# Patient Record
Sex: MALE | Race: WHITE | NOT HISPANIC OR LATINO | Employment: OTHER | URBAN - NONMETROPOLITAN AREA
[De-identification: names, ages, dates, MRNs, and addresses within clinical notes are randomized per-mention and may not be internally consistent; named-entity substitution may affect disease eponyms.]

---

## 2023-09-26 ENCOUNTER — TRANSCRIBE ORDERS (OUTPATIENT)
Dept: CARDIOLOGY | Facility: CLINIC | Age: 88
End: 2023-09-26
Payer: MEDICARE

## 2023-09-26 DIAGNOSIS — Z95.2 HISTORY OF MITRAL VALVE REPLACEMENT: ICD-10-CM

## 2023-09-26 DIAGNOSIS — I48.20 CHRONIC ATRIAL FIBRILLATION (MULTI): ICD-10-CM

## 2023-09-26 DIAGNOSIS — N30.91 HEMATURIA DUE TO CYSTITIS: ICD-10-CM

## 2023-09-26 DIAGNOSIS — Z95.5 STENTED CORONARY ARTERY: ICD-10-CM

## 2023-11-01 ENCOUNTER — TELEMEDICINE (OUTPATIENT)
Dept: CARDIOLOGY | Facility: CLINIC | Age: 88
End: 2023-11-01
Payer: MEDICARE

## 2023-11-01 DIAGNOSIS — I48.0 PAROXYSMAL ATRIAL FIBRILLATION (MULTI): Primary | ICD-10-CM

## 2023-11-01 PROCEDURE — 99204 OFFICE O/P NEW MOD 45 MIN: CPT | Performed by: INTERNAL MEDICINE

## 2023-11-01 PROCEDURE — 99214 OFFICE O/P EST MOD 30 MIN: CPT | Mod: 95 | Performed by: INTERNAL MEDICINE

## 2023-11-01 NOTE — PROGRESS NOTES
Virtual visit      Cardio: Dr. Fransisco TANG was asked by Dr. Moody to evaluate this patient in consultation for evaluation of left atrial appendage closure.    The patient is a 88-year-old gentleman with past medical history of CABG with subsequent redo CABG in 2019 with PFO closure, mitral valve repair, tricuspid valve repair and left atrial appendage ligation. The patient has normal LVEF.    The patient has been maintained on anticoagulation post left atrial appendage ligation in 2019.  Patient continues to take Xarelto.  Patient experienced episodes of hematuria.  His primary cardiology team does not think that he is a good candidate for long-term anticoagulation.  He is referred to our clinic for further recommendation.    Given the patient's significant urological bleeding,  the patient is referred for consideration of left atrial appendage closure for stroke risk reduction in case he has residual problems left from left atrial appendage ligation.      ROS:  Constitutional: not feeling tired, not feeling poorly, no fever and no chills.   Eyes: no eyesight problems, no blurred vision, no diplopia, no eye pain, no purulent discharge from the eyes, eyes not red, no dryness of the eyes and no itching of the eyes.   ENT: no nosebleeds, no hearing loss, no tinnitus, no earache, no sore throat, no hoarseness, no swollen glands in the neck and no nasal discharge.   Cardiovascular: no intermittent leg claudication, no chest pain, no tightness or heavy pressure, no shortness of breath, no palpitations, no lower extremity edema, the heart rate was not slow, the heart rate was not fast and as noted in HPI.   Respiratory: no chronic cough, not coughing up sputum,  no wheezing that is consistent with asthma, no asthma, no orthopnea and no postural nocturnal dyspnea.   Gastrointestinal: no change in bowel habits, no blood in stools, no diarrhea, no constipation, no nausea, no vomiting, no abdominal pain, no signs and  symptoms of ulcer disease, no norman colored stools and no intolerance to fatty foods.   Genitourinary: no hematuria,  no urinary frequency, no dysuria, no incontinence, no burning sensation during urination, no urinary hesitancy, no nocturia, no genital lesion, no testicular pain, urinary stream is not smaller and urinary stream does not start and stop.   Musculoskeletal: no arthralgias, no myalgias, no joint swelling, no joint stiffness, no muscle weakness, no back pain, no limb pain, no limb swelling and no difficulty walking.   Skin: no skin rashes, no change in skin color and pigmentation, no skin lesions and no skin lumps.   Neurological: no seizures, no frequent falls, no headaches, no dizziness, no tingling, no fainting and no limb weakness.   Psychiatric: no depression, not suicidal, no confusion, no memory lapses or loss, no anxiety, no personality change and no emotional problems.   Endocrine: no goiter, no thyroid disorder, no diabetes mellitus, no excessive thirst, no dry skin, no cold intolerance, no heat intolerance, no erectile dysfunction, no increased urinary frequency, no proptosis and no deepening of the voice.   Hematologic/Lymphatic: no bleeding issues.   All other systems have been reviewed and are negative for complaint.     Physical Exam:     There were no vitals taken for this visit.       Labs:    Results for orders placed or performed in visit on 03/28/19   Comprehensive Metabolic Panel   Result Value Ref Range    Glucose 102 (H) 74 - 99 mg/dL    Sodium 140 136 - 145 mmol/L    Potassium 3.8 3.5 - 5.3 mmol/L    Chloride 104 98 - 107 mmol/L    Bicarbonate 25 21 - 32 mmol/L    Anion Gap 15 10 - 20 mmol/L    Urea Nitrogen 22 6 - 23 mg/dL    Creatinine 0.88 0.50 - 1.30 mg/dL    GLOMERULAR FILTRATION RATE-NON AFRICAN AMERICAN >60 >60 mL/min/1.73m2    GLOMERULAR FILTRATION RATE- >60 >60 mL/min/1.73m2    Calcium 9.4 8.6 - 10.6 mg/dL    Albumin 4.0 3.4 - 5.0 g/dL    Alkaline  Phosphatase 59 33 - 136 U/L    Total Protein 5.8 (L) 6.4 - 8.2 g/dL    AST 16 9 - 39 U/L    Total Bilirubin 0.7 0.0 - 1.2 mg/dL    ALT (SGPT) 16 10 - 52 U/L   Hemoglobin A1C   Result Value Ref Range    Hemoglobin A1C 5.4 %    Estimated Average Glucose 108 MG/DL   CBC and Auto Differential   Result Value Ref Range    WBC 4.9 4.4 - 11.3 x10E9/L    nRBC 0.0 0.0 - 0.0 /100 WBC    RBC 3.47 (L) 4.50 - 5.90 x10E12/L    Hemoglobin 11.7 (L) 13.5 - 17.5 g/dL    Hematocrit 35.4 (L) 41.0 - 52.0 %     (H) 80 - 100 fL    MCHC 33.1 32.0 - 36.0 g/dL    Platelets 158 150 - 450 x10E9/L    RDW 13.6 11.5 - 14.5 %    Neutrophils % 56.0 40.0 - 80.0 %    Immature Granulocytes %, Automated 0.2 0.0 - 0.9 %    Lymphocytes % 28.5 13.0 - 44.0 %    Monocytes % 11.8 2.0 - 10.0 %    Eosinophils % 2.9 0.0 - 6.0 %    Basophils % 0.6 0.0 - 2.0 %    Neutrophils Absolute 2.75 1.60 - 5.50 x10E9/L    Lymphocytes Absolute 1.40 0.80 - 3.00 x10E9/L    Monocytes Absolute 0.58 0.05 - 0.80 x10E9/L    Eosinophils Absolute 0.14 0.00 - 0.40 x10E9/L    Basophils Absolute 0.03 0.00 - 0.10 x10E9/L   Coagulation Screen   Result Value Ref Range    Protime 11.5 9.7 - 12.7 sec    INR 1.0 0.9 - 1.1    aPTT 34 28 - 38 sec   Abo/Rh Group Test   Result Value Ref Range    ABO GROUP (TYPE) IN BLOOD A     Rh POS    Type And Screen Data Conversion   Result Value Ref Range    ABO GROUP (TYPE) IN BLOOD A     Rh POS     ANTIBODY SCREEN NEG           Medications:    No current outpatient medications       Assessment:    The patient is a 88-year-old gentleman with past medical history of CABG with subsequent redo CABG in 2019 with PFO closure, mitral valve repair, tricuspid valve repair and left atrial appendage ligation.  He has had episodes of urological bleeding while being on Xarelto.  He is deemed to be a poor candidate for long-term anticoagulation.    The CHADS-VASC score is 5 and HAS-BLED score is 3. The patient is at increased risk of both bleeding and stroke.  As  such the patient would seem a reasonable candidate for consideration of left atrial appendage occluder placement. However, he had left atrial appendage ligation performed as part of his open heart surgery in 2019.  There is still a possibility of a residual pouch after such a surgery which can predispose patient to embolic strokes, which could be potentially amenable to percutaneous left atrial appendage closure.  In order to determine that we will plan a CT scan on the patient.    Once the CT scan is performed.  We will bring the patient back for follow-up and discuss the findings before final decision is made.    Thank you, Dr. Moody, for this consultation and for allowing me to participate in the care of this patient.

## 2023-11-02 PROBLEM — Z95.4 S/P TRICUSPID VALVE REPLACEMENT: Status: ACTIVE | Noted: 2023-11-02

## 2023-11-02 PROBLEM — I10 ESSENTIAL HYPERTENSION: Status: ACTIVE | Noted: 2023-11-02

## 2023-11-02 PROBLEM — K22.2 SCHATZKI'S RING: Status: ACTIVE | Noted: 2023-11-02

## 2023-11-02 PROBLEM — E78.5 HYPERLIPIDEMIA: Status: ACTIVE | Noted: 2023-11-02

## 2023-11-02 PROBLEM — R31.21 ASYMPTOMATIC MICROSCOPIC HEMATURIA: Status: ACTIVE | Noted: 2023-11-02

## 2023-11-02 PROBLEM — K44.9 HIATAL HERNIA: Status: ACTIVE | Noted: 2023-11-02

## 2023-11-02 PROBLEM — I25.10 CORONARY ARTERY DISEASE: Status: ACTIVE | Noted: 2023-11-02

## 2023-11-02 PROBLEM — I48.20 CHRONIC ATRIAL FIBRILLATION (MULTI): Status: ACTIVE | Noted: 2023-11-02

## 2023-11-02 PROBLEM — I34.0 MITRAL REGURGITATION: Status: ACTIVE | Noted: 2023-11-02

## 2023-11-02 PROBLEM — Z95.5 S/P CORONARY ARTERY STENT PLACEMENT: Status: ACTIVE | Noted: 2023-11-02

## 2023-11-02 PROBLEM — Z85.46 HISTORY OF PROSTATE CANCER: Status: ACTIVE | Noted: 2023-11-02

## 2023-11-02 RX ORDER — GLUCOSAMINE/CHONDR SU A SOD 167-133 MG
500 CAPSULE ORAL DAILY
COMMUNITY
End: 2023-11-06 | Stop reason: ALTCHOICE

## 2023-11-02 RX ORDER — PERPHENAZINE/AMITRIPTYLINE HCL 2 MG-25 MG
1 TABLET ORAL DAILY
COMMUNITY
End: 2023-11-06 | Stop reason: ALTCHOICE

## 2023-11-02 RX ORDER — ACETAMINOPHEN 500 MG
5000 TABLET ORAL DAILY
COMMUNITY
End: 2023-11-06 | Stop reason: ALTCHOICE

## 2023-11-06 ENCOUNTER — TELEPHONE (OUTPATIENT)
Dept: CARDIOLOGY | Facility: HOSPITAL | Age: 88
End: 2023-11-06

## 2023-11-06 ENCOUNTER — OFFICE VISIT (OUTPATIENT)
Dept: CARDIOLOGY | Facility: CLINIC | Age: 88
End: 2023-11-06
Payer: MEDICARE

## 2023-11-06 VITALS
WEIGHT: 164 LBS | SYSTOLIC BLOOD PRESSURE: 146 MMHG | BODY MASS INDEX: 24.29 KG/M2 | HEIGHT: 69 IN | DIASTOLIC BLOOD PRESSURE: 82 MMHG | HEART RATE: 68 BPM

## 2023-11-06 DIAGNOSIS — Z79.01 LONG TERM CURRENT USE OF ANTICOAGULANT THERAPY: ICD-10-CM

## 2023-11-06 DIAGNOSIS — I48.91 ATRIAL FIBRILLATION, UNSPECIFIED TYPE (MULTI): ICD-10-CM

## 2023-11-06 DIAGNOSIS — Z95.4 S/P TRICUSPID VALVE REPLACEMENT: ICD-10-CM

## 2023-11-06 DIAGNOSIS — Z98.890 S/P MITRAL VALVE REPAIR: ICD-10-CM

## 2023-11-06 DIAGNOSIS — I48.0 PAROXYSMAL ATRIAL FIBRILLATION (MULTI): ICD-10-CM

## 2023-11-06 DIAGNOSIS — Z78.9 STATIN INTOLERANCE: ICD-10-CM

## 2023-11-06 DIAGNOSIS — I25.10 CORONARY ARTERY DISEASE INVOLVING NATIVE CORONARY ARTERY OF NATIVE HEART WITHOUT ANGINA PECTORIS: Primary | ICD-10-CM

## 2023-11-06 PROBLEM — C61 CARCINOMA OF PROSTATE (MULTI): Status: ACTIVE | Noted: 2023-11-06

## 2023-11-06 PROBLEM — I48.20 CHRONIC ATRIAL FIBRILLATION (MULTI): Status: RESOLVED | Noted: 2023-11-02 | Resolved: 2023-11-06

## 2023-11-06 PROCEDURE — 99213 OFFICE O/P EST LOW 20 MIN: CPT | Performed by: NURSE PRACTITIONER

## 2023-11-06 PROCEDURE — 1036F TOBACCO NON-USER: CPT | Performed by: NURSE PRACTITIONER

## 2023-11-06 PROCEDURE — 1159F MED LIST DOCD IN RCRD: CPT | Performed by: NURSE PRACTITIONER

## 2023-11-06 PROCEDURE — 3077F SYST BP >= 140 MM HG: CPT | Performed by: NURSE PRACTITIONER

## 2023-11-06 PROCEDURE — 3079F DIAST BP 80-89 MM HG: CPT | Performed by: NURSE PRACTITIONER

## 2023-11-06 PROCEDURE — 1160F RVW MEDS BY RX/DR IN RCRD: CPT | Performed by: NURSE PRACTITIONER

## 2023-11-06 ASSESSMENT — ENCOUNTER SYMPTOMS
SYNCOPE: 0
DYSPNEA ON EXERTION: 0
IRREGULAR HEARTBEAT: 0
ORTHOPNEA: 0
NEAR-SYNCOPE: 0
PALPITATIONS: 0
PND: 0

## 2023-11-06 NOTE — ASSESSMENT & PLAN NOTE
April 2019  TVR #28 annuloplasty ring  MVR #29 Medtronic Simulus band  Ligation left atrial appendage  PFO closure    Redo CABG with reverse SVG to posterior lateral circumflex    Valvular function reassess September 2023 echo with normally functioning repairs.  LVEF 55 to 60%

## 2023-11-06 NOTE — PATIENT INSTRUCTIONS
Please bring all medicines, vitamins, and herbal supplements with you when you come to the office.    Prescriptions will not be filled unless you are compliant with your follow up appointments or have a follow up appointment scheduled as per instruction of your physician. Refills should be requested at the time of your visit.    PLAN:   Through informed decision making process incorporating patients unique circumstances, the following treatment plan will be initiated:    1.  Prescription drug management of cardiovascular medication for efficacy, adherence to treatment, side effect assessment and polypharmacy. Current treatment clinically warranted and to continue without modifications.    2. Return for follow-up; in the interim, contact the office if new symptoms arise.  Dr. Moody as scheduled

## 2023-11-06 NOTE — ASSESSMENT & PLAN NOTE
Current daily activity 4 METS without recurrent symptoms    Secondary prevention:  Statin intolerant  No diabetes  Untreated hyperlipidemia

## 2023-11-06 NOTE — ASSESSMENT & PLAN NOTE
CHADS VASc 3   Summer 2023 hematuria on Coumadin  Rechallenged with Xarelto 20 mg with recurrent hematuria: Dose reduced to 10 mg daily.  Has been seen and evaluated by Dr. Pineda with plans for CT to reassess left atrial appendage  Currently denies any bleeding diatheses on Xarelto 10 mg

## 2023-11-06 NOTE — PROGRESS NOTES
"Chief Complaint  \"Doing good\"    Reason for Visit  2-month follow-up to reassess tolerance to anticoagulation.  Patient presents to the office today for outpatient follow-up for anticoagulation management.  Last evaluated in clinic by Dr. Moody September 2023.  At that time he was placed on Xarelto 20 mg daily.  He called into the office with mild hematuria and dose was reduced to 10 mg daily.  He has been tolerating that dose without difficulties.  He was recently evaluated by Dr. Pineda, is in process of scheduling CT.    Presents today ambulatory with steady gait.  Accompanied by patient  Patient denies any hospitalizations or significant changes to interval medical history since last office follow-up.     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  He ambulated in from the parking lot without concerns.  He reports he can\" never tell\" when he is out of rhythm, auscultatory regular rate and rhythm on exam.  He continues to tolerate low-dose Xarelto without any bleeding diatheses.  He is established with Dr. Pineda with testing arranged.    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  Denies any change in exercise capacity or functional tolerance since last office visit.    The importance of secondary prevention reviewed:  HTN: Denies treatment  HLD: Statin intolerant  DM: Denies  Smoker: Denies  BMI:  Reviewed the merits of healthy lifestyle choices on overall cardiovascular health.  He is off aspirin due to Xarelto.    Review of Systems   Cardiovascular:  Negative for chest pain, dyspnea on exertion, irregular heartbeat, leg " "swelling, near-syncope, orthopnea, palpitations, paroxysmal nocturnal dyspnea and syncope.        Visit Vitals  /82 (BP Location: Left arm, Patient Position: Sitting)   Pulse 68   Ht 1.753 m (5' 9\")   Wt 74.4 kg (164 lb)   BMI 24.22 kg/m²   Smoking Status Former   BSA 1.9 m²     Physical Exam  Vitals and nursing note reviewed.   Constitutional:       Appearance: Normal appearance.   Cardiovascular:      Rate and Rhythm: Normal rate and regular rhythm.      Heart sounds: Normal heart sounds.   Pulmonary:      Effort: Pulmonary effort is normal.      Breath sounds: Normal breath sounds.   Musculoskeletal:      Cervical back: Full passive range of motion without pain.      Right lower leg: No edema.      Left lower leg: No edema.   Skin:     General: Skin is cool.   Neurological:      Mental Status: He is alert and oriented to person, place, and time.   Psychiatric:         Attention and Perception: Attention normal.         Mood and Affect: Mood normal.         Behavior: Behavior is cooperative.        Allergies   Allergen Reactions    Statins-Hmg-Coa Reductase Inhibitors Myalgia    Ace Inhibitors Unknown    Metoprolol Unknown         Current Outpatient Medications   Medication Instructions    rivaroxaban (XARELTO) 10 mg, oral, Daily      Assessment:    Coronary artery disease  Current daily activity 4 METS without recurrent symptoms    Secondary prevention:  Statin intolerant  No diabetes  Untreated hyperlipidemia    Essential hypertension  Currently no treatment    Paroxysmal atrial fibrillation (CMS/HCC)  Auscultatory regular rate and rhythm in the office today    Cardiac and Vasculature  April 2019  TVR #28 annuloplasty ring  MVR #29 Medtronic Simulus band  Ligation left atrial appendage  PFO closure    Redo CABG with reverse SVG to posterior lateral circumflex    Valvular function reassess September 2023 echo with normally functioning repairs.  LVEF 55 to 60%    Long term current use of anticoagulant " therapy  CHADS VASc 3   Summer 2023 hematuria on Coumadin  Rechallenged with Xarelto 20 mg with recurrent hematuria: Dose reduced to 10 mg daily.  Has been seen and evaluated by Dr. Pineda with plans for CT to reassess left atrial appendage  Currently denies any bleeding diatheses on Xarelto 10 mg       Plan:     Through informed decision making process incorporating patients unique circumstances, the following treatment plan will be initiated:    1.  Prescription drug management of cardiovascular medication for efficacy, adherence to treatment, side effect assessment and polypharmacy. Current treatment clinically warranted and to continue without modifications.    2. Return for follow-up; in the interim, contact the office if new symptoms arise.  Dr. Moody as scheduled    3.  Keep scheduled testing and follow-up as arranged with Dr. Edwin Isidro  MSN, APRN-CNP, PMHNP-BC  [facility]    Please excuse any errors in grammar or translation related to this dictation. Voice recognition software was utilized to prepare this document.

## 2023-11-14 ENCOUNTER — ANCILLARY PROCEDURE (OUTPATIENT)
Dept: RADIOLOGY | Facility: CLINIC | Age: 88
End: 2023-11-14
Payer: MEDICARE

## 2023-11-14 VITALS
DIASTOLIC BLOOD PRESSURE: 75 MMHG | HEART RATE: 62 BPM | SYSTOLIC BLOOD PRESSURE: 141 MMHG | OXYGEN SATURATION: 96 % | RESPIRATION RATE: 16 BRPM

## 2023-11-14 DIAGNOSIS — I48.91 ATRIAL FIBRILLATION, UNSPECIFIED TYPE (MULTI): ICD-10-CM

## 2023-11-14 PROCEDURE — 75572 CT HRT W/3D IMAGE: CPT | Performed by: INTERNAL MEDICINE

## 2023-11-14 PROCEDURE — 75572 CT HRT W/3D IMAGE: CPT

## 2023-11-14 PROCEDURE — 2500000004 HC RX 250 GENERAL PHARMACY W/ HCPCS (ALT 636 FOR OP/ED): Performed by: INTERNAL MEDICINE

## 2023-11-14 PROCEDURE — 2550000001 HC RX 255 CONTRASTS: Performed by: INTERNAL MEDICINE

## 2023-11-14 RX ADMIN — IOHEXOL 70 ML: 350 INJECTION, SOLUTION INTRAVENOUS at 11:56

## 2023-11-14 RX ADMIN — SODIUM CHLORIDE 500 ML: 0.9 INJECTION, SOLUTION INTRAVENOUS at 14:13

## 2023-11-17 ENCOUNTER — APPOINTMENT (OUTPATIENT)
Dept: RADIOLOGY | Facility: CLINIC | Age: 88
End: 2023-11-17
Payer: MEDICARE

## 2023-11-21 ENCOUNTER — APPOINTMENT (OUTPATIENT)
Dept: RADIOLOGY | Facility: CLINIC | Age: 88
End: 2023-11-21
Payer: MEDICARE

## 2023-11-29 ENCOUNTER — TELEPHONE (OUTPATIENT)
Dept: CARDIOLOGY | Facility: CLINIC | Age: 88
End: 2023-11-29

## 2023-11-29 ENCOUNTER — TELEMEDICINE (OUTPATIENT)
Dept: CARDIOLOGY | Facility: CLINIC | Age: 88
End: 2023-11-29
Payer: MEDICARE

## 2023-11-29 DIAGNOSIS — I48.11 LONGSTANDING PERSISTENT ATRIAL FIBRILLATION (MULTI): Primary | ICD-10-CM

## 2023-11-29 PROCEDURE — 99212 OFFICE O/P EST SF 10 MIN: CPT | Performed by: INTERNAL MEDICINE

## 2023-11-29 NOTE — TELEPHONE ENCOUNTER
Patient verbalized understanding on stopping xarelto.    Patient is now inquiring should he go back on ASA 81mg daily.

## 2023-11-29 NOTE — TELEPHONE ENCOUNTER
Patient can come off Xarelto altogether, his left atrial appendage is previously been ligated and reviewed per CT imaging by Dr. Pineda.

## 2023-11-30 NOTE — PROGRESS NOTES
Virtual Visit      Cardio: Dr. Moody      For chart review, the patient is a 88-year-old gentleman with past medical history of CABG with subsequent redo CABG in 2019 with PFO closure, mitral valve repair, tricuspid valve repair and left atrial appendage ligation. The patient has normal LVEF. The patient has been maintained on anticoagulation post left atrial appendage ligation in 2019.  Patient continues to take Xarelto.  Patient experienced episodes of hematuria.  His primary cardiology team does not think that he is a good candidate for long-term anticoagulation.  He was referred to our clinic for further recommendation.     We performed a CT scan on the patient which confirmed successful and good-quality left atrial appendage ligation.  The patient comes for follow-up consultation as he was previously seen in our clinic for possible percutaneous left atrial appendage closure.    Virtual visit. Audio only. 10 minutes.    ROS:  Constitutional: not feeling tired, not feeling poorly, no fever and no chills.   Eyes: no eyesight problems, no blurred vision, no diplopia, no eye pain, no purulent discharge from the eyes, eyes not red, no dryness of the eyes and no itching of the eyes.   ENT: no nosebleeds, no hearing loss, no tinnitus, no earache, no sore throat, no hoarseness, no swollen glands in the neck and no nasal discharge.   Cardiovascular: no intermittent leg claudication, no chest pain, no tightness or heavy pressure, no shortness of breath, no palpitations, no lower extremity edema, the heart rate was not slow, the heart rate was not fast and as noted in HPI.   Respiratory: no chronic cough, not coughing up sputum,  no wheezing that is consistent with asthma, no asthma, no orthopnea and no postural nocturnal dyspnea.   Gastrointestinal: no change in bowel habits, no blood in stools, no diarrhea, no constipation, no nausea, no vomiting, no abdominal pain, no signs and symptoms of ulcer disease, no norman  colored stools and no intolerance to fatty foods.   Genitourinary: no hematuria,  no urinary frequency, no dysuria, no incontinence, no burning sensation during urination, no urinary hesitancy, no nocturia, no genital lesion, no testicular pain, urinary stream is not smaller and urinary stream does not start and stop.   Musculoskeletal: no arthralgias, no myalgias, no joint swelling, no joint stiffness, no muscle weakness, no back pain, no limb pain, no limb swelling and no difficulty walking.   Skin: no skin rashes, no change in skin color and pigmentation, no skin lesions and no skin lumps.   Neurological: no seizures, no frequent falls, no headaches, no dizziness, no tingling, no fainting and no limb weakness.   Psychiatric: no depression, not suicidal, no confusion, no memory lapses or loss, no anxiety, no personality change and no emotional problems.   Endocrine: no goiter, no thyroid disorder, no diabetes mellitus, no excessive thirst, no dry skin, no cold intolerance, no heat intolerance, no erectile dysfunction, no increased urinary frequency, no proptosis and no deepening of the voice.   Hematologic/Lymphatic: no bleeding issues.   All other systems have been reviewed and are negative for complaint.     Physical Exam:     Visit Vitals  Smoking Status Former        Constitutional: alert and in no acute distress.   Eyes: no erythema, swelling or discharge from the eye .   Ears, Nose, Mouth, and Throat: external inspection of ears and nose is normal , lips, teeth, and gums are normal with good dentition  and oropharynx normal with no erythema, edema, exudate or lesions .   Neck: neck is supple, symmetric, trachea midline, no masses  and no thyromegaly .   Pulmonary: no increased work of breathing or signs of respiratory distress , lungs clear to auscultation. , normal percussion of chest  and chest palpation normal .   Cardiovascular: RRR, no murmur,  no leg edema  Abdomen: abdomen non-tender, no masses  and  no hepatomegaly .           Skin:  no skin lesions          Neurologic: non-focal neurologic examination.      Psychiatric judgment and insight is normal , oriented to person, place and time , normal mood and affect .       Labs:    Results for orders placed or performed in visit on 03/28/19   Comprehensive Metabolic Panel   Result Value Ref Range    Glucose 102 (H) 74 - 99 mg/dL    Sodium 140 136 - 145 mmol/L    Potassium 3.8 3.5 - 5.3 mmol/L    Chloride 104 98 - 107 mmol/L    Bicarbonate 25 21 - 32 mmol/L    Anion Gap 15 10 - 20 mmol/L    Urea Nitrogen 22 6 - 23 mg/dL    Creatinine 0.88 0.50 - 1.30 mg/dL    GLOMERULAR FILTRATION RATE-NON AFRICAN AMERICAN >60 >60 mL/min/1.73m2    GLOMERULAR FILTRATION RATE- >60 >60 mL/min/1.73m2    Calcium 9.4 8.6 - 10.6 mg/dL    Albumin 4.0 3.4 - 5.0 g/dL    Alkaline Phosphatase 59 33 - 136 U/L    Total Protein 5.8 (L) 6.4 - 8.2 g/dL    AST 16 9 - 39 U/L    Total Bilirubin 0.7 0.0 - 1.2 mg/dL    ALT (SGPT) 16 10 - 52 U/L   Hemoglobin A1C   Result Value Ref Range    Hemoglobin A1C 5.4 %    Estimated Average Glucose 108 MG/DL   CBC and Auto Differential   Result Value Ref Range    WBC 4.9 4.4 - 11.3 x10E9/L    nRBC 0.0 0.0 - 0.0 /100 WBC    RBC 3.47 (L) 4.50 - 5.90 x10E12/L    Hemoglobin 11.7 (L) 13.5 - 17.5 g/dL    Hematocrit 35.4 (L) 41.0 - 52.0 %     (H) 80 - 100 fL    MCHC 33.1 32.0 - 36.0 g/dL    Platelets 158 150 - 450 x10E9/L    RDW 13.6 11.5 - 14.5 %    Neutrophils % 56.0 40.0 - 80.0 %    Immature Granulocytes %, Automated 0.2 0.0 - 0.9 %    Lymphocytes % 28.5 13.0 - 44.0 %    Monocytes % 11.8 2.0 - 10.0 %    Eosinophils % 2.9 0.0 - 6.0 %    Basophils % 0.6 0.0 - 2.0 %    Neutrophils Absolute 2.75 1.60 - 5.50 x10E9/L    Lymphocytes Absolute 1.40 0.80 - 3.00 x10E9/L    Monocytes Absolute 0.58 0.05 - 0.80 x10E9/L    Eosinophils Absolute 0.14 0.00 - 0.40 x10E9/L    Basophils Absolute 0.03 0.00 - 0.10 x10E9/L   Coagulation Screen   Result Value Ref Range     Protime 11.5 9.7 - 12.7 sec    INR 1.0 0.9 - 1.1    aPTT 34 28 - 38 sec   Abo/Rh Group Test   Result Value Ref Range    ABO GROUP (TYPE) IN BLOOD A     Rh POS    Type And Screen Data Conversion   Result Value Ref Range    ABO GROUP (TYPE) IN BLOOD A     Rh POS     ANTIBODY SCREEN NEG           Medications:    No current outpatient medications       Assessment:    The patient is a 88-year-old gentleman with past medical history of CABG with subsequent redo CABG in 2019 with PFO closure, mitral valve repair, tricuspid valve repair and left atrial appendage ligation. The patient has normal LVEF.     The patient was seen in our clinic because he was having hematuria on oral anticoagulation which he continued despite left atrial appendage ligation.  We performed a CT scan which indeed shows successful and good quality left atrial appendage ligation.  Considering this, we believe it reasonable for the patient to be taken off anticoagulation especially in the view of his hematuria and switched to aspirin alone approach but defer this to Dr. Moody.    We thank Dr. Moody for allowing us to participate in this patient's care.  The patient will continue to follow-up with him for future care.

## 2023-12-06 ENCOUNTER — OFFICE VISIT (OUTPATIENT)
Dept: CARDIOLOGY | Facility: CLINIC | Age: 88
End: 2023-12-06
Payer: MEDICARE

## 2023-12-06 VITALS
HEIGHT: 69 IN | WEIGHT: 166 LBS | BODY MASS INDEX: 24.59 KG/M2 | SYSTOLIC BLOOD PRESSURE: 130 MMHG | DIASTOLIC BLOOD PRESSURE: 78 MMHG | HEART RATE: 68 BPM

## 2023-12-06 DIAGNOSIS — Z78.9 ANTICOAGULANT NOT TOLERATED: ICD-10-CM

## 2023-12-06 DIAGNOSIS — I25.10 CORONARY ARTERY DISEASE INVOLVING NATIVE CORONARY ARTERY OF NATIVE HEART WITHOUT ANGINA PECTORIS: Primary | ICD-10-CM

## 2023-12-06 DIAGNOSIS — I48.0 PAROXYSMAL ATRIAL FIBRILLATION (MULTI): ICD-10-CM

## 2023-12-06 PROBLEM — Z79.01 LONG TERM CURRENT USE OF ANTICOAGULANT THERAPY: Status: RESOLVED | Noted: 2023-11-06 | Resolved: 2023-12-06

## 2023-12-06 PROCEDURE — 3075F SYST BP GE 130 - 139MM HG: CPT | Performed by: NURSE PRACTITIONER

## 2023-12-06 PROCEDURE — 1159F MED LIST DOCD IN RCRD: CPT | Performed by: NURSE PRACTITIONER

## 2023-12-06 PROCEDURE — 99213 OFFICE O/P EST LOW 20 MIN: CPT | Performed by: NURSE PRACTITIONER

## 2023-12-06 PROCEDURE — 1160F RVW MEDS BY RX/DR IN RCRD: CPT | Performed by: NURSE PRACTITIONER

## 2023-12-06 PROCEDURE — 3078F DIAST BP <80 MM HG: CPT | Performed by: NURSE PRACTITIONER

## 2023-12-06 PROCEDURE — 1036F TOBACCO NON-USER: CPT | Performed by: NURSE PRACTITIONER

## 2023-12-06 RX ORDER — ASPIRIN 81 MG/1
81 TABLET ORAL DAILY
COMMUNITY

## 2023-12-06 NOTE — PATIENT INSTRUCTIONS
Please bring all medicines, vitamins, and herbal supplements with you when you come to the office.    Prescriptions will not be filled unless you are compliant with your follow up appointments or have a follow up appointment scheduled as per instruction of your physician. Refills should be requested at the time of your visit.     PLAN:   Through informed decision making process incorporating patients unique circumstances, the following treatment plan will be initiated:    1.  Prescription drug management of cardiovascular medication for efficacy, adherence to treatment, side effect assessment and polypharmacy. Current treatment clinically warranted and to continue without modifications.    2. Return for follow-up; in the interim, contact the office if new symptoms arise.  Dr. Moody 6 months

## 2023-12-07 ASSESSMENT — ENCOUNTER SYMPTOMS
NEAR-SYNCOPE: 0
SYNCOPE: 0
IRREGULAR HEARTBEAT: 0
PALPITATIONS: 0
ORTHOPNEA: 0
PND: 0
DYSPNEA ON EXERTION: 0

## 2023-12-07 NOTE — PROGRESS NOTES
"Chief Complaint  \" Doing fine\"    Reason for Visit  This was rescheduled routine annual follow-up (although he had been seen earlier last month due to hematuria  Patient presents to the office today for outpatient follow-up for atrial fibrillation.  Last evaluated in clinic by myself November 2023.  Following that visit, he was evaluated with Dr. Pineda where left atrial appendage ligation was noted to be sufficient, no indication for Watchman device.    Presents today ambulatory with steady gait.  Accompanied by patient  Patient denies any hospitalizations or significant changes to interval medical history since last office follow-up.     History of Present Illness   Patient remains an extremely pleasant 88-year-old gentleman who presents today without voiced cardiovascular complaints.  He remains aerobically active where he is remodeling his kitchen.  He denies any type of exertional symptoms.  Denies any palpitations, no dizziness or lightheadedness.  He has no exertional symptoms.  Overall reports doing\" just fine\".    Patient reports that overall has no complaint(s) of chest pain, claudication, dyspnea, exertional chest pressure/discomfort, fatigue, irregular heart beat, and palpitations    Daily activity:   Remodeling a home, ADLs, yard work.  Denies any change in exercise capacity or functional tolerance since last office visit.    The importance of secondary prevention reviewed:  HTN: Denies  HLD: Statin intolerant  DM: Denies  Smoker: Denies  BMI:  Reviewed the merits of healthy lifestyle choices on overall cardiovascular health.    Overall patient is pleased with current state of cardiovascular health.  At this time there are no indications for additional cardiovascular testing or need for medication changes.     Review of Systems   Cardiovascular:  Negative for chest pain, dyspnea on exertion, irregular heartbeat, leg swelling, near-syncope, orthopnea, palpitations, paroxysmal nocturnal dyspnea and " "syncope.        Visit Vitals  /78 (BP Location: Right arm, Patient Position: Sitting)   Pulse 68   Ht 1.753 m (5' 9\")   Wt 75.3 kg (166 lb)   BMI 24.51 kg/m²   Smoking Status Former   BSA 1.91 m²     Physical Exam  Vitals and nursing note reviewed.   Constitutional:       Appearance: Normal appearance.   Cardiovascular:      Rate and Rhythm: Normal rate and regular rhythm.      Heart sounds: Heart sounds are distant.   Pulmonary:      Effort: Pulmonary effort is normal.      Breath sounds: Normal breath sounds.   Musculoskeletal:      Cervical back: Full passive range of motion without pain.      Right lower leg: No edema.      Left lower leg: No edema.   Skin:     General: Skin is cool.   Neurological:      Mental Status: He is alert and oriented to person, place, and time.   Psychiatric:         Attention and Perception: Attention normal.         Mood and Affect: Mood normal.         Behavior: Behavior is cooperative.        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  LVEF 55 to 60%    Coronary artery disease  Current daily activity 4 METS without recurrent symptoms     Secondary prevention:  Statin intolerant  No diabetes  Untreated hyperlipidemia    Paroxysmal atrial fibrillation (CMS/HCC)  September 2023 EKG normal sinus rhythm  Prior 2021 EKG atrial fibrillation with CVR on no AV delilah agents  Denies any palpitations  Regular ausculatory rate and rhythm    Anticoagulant not tolerated  Recently evaluated by Dr. Pineda where a CT " showed left atrial appendage ligation without evidence of sac.    He is off anticoagulation due to urologic bleeding.    Plan:     Through informed decision making process incorporating patients unique circumstances, the following treatment plan will be initiated:    1.  Prescription drug management of cardiovascular medication for efficacy, adherence to treatment, side effect assessment and polypharmacy. Current treatment clinically warranted and to continue without modifications.    2. Return for follow-up; in the interim, contact the office if new symptoms arise.  Dr. Moody 6 months     Madeline Isidro  MSN, APRN-CNP, PMHNP-BC  [facility]    Please excuse any errors in grammar or translation related to this dictation. Voice recognition software was utilized to prepare this document.

## 2023-12-07 NOTE — ASSESSMENT & PLAN NOTE
Recently evaluated by Dr. Pineda where a CT showed left atrial appendage ligation without evidence of sac.    He is off anticoagulation due to urologic bleeding.

## 2023-12-07 NOTE — ASSESSMENT & PLAN NOTE
September 2023 EKG normal sinus rhythm  Prior 2021 EKG atrial fibrillation with CVR on no AV delilah agents  Denies any palpitations  Regular ausculatory rate and rhythm

## 2023-12-20 ENCOUNTER — APPOINTMENT (OUTPATIENT)
Dept: CARDIOLOGY | Facility: CLINIC | Age: 88
End: 2023-12-20
Payer: MEDICARE

## 2024-09-12 ENCOUNTER — APPOINTMENT (OUTPATIENT)
Dept: CARDIOLOGY | Facility: CLINIC | Age: 89
End: 2024-09-12
Payer: MEDICARE

## 2024-09-12 VITALS
BODY MASS INDEX: 23.7 KG/M2 | HEART RATE: 66 BPM | DIASTOLIC BLOOD PRESSURE: 70 MMHG | SYSTOLIC BLOOD PRESSURE: 158 MMHG | HEIGHT: 69 IN | WEIGHT: 160 LBS

## 2024-09-12 DIAGNOSIS — E78.5 HYPERLIPIDEMIA, UNSPECIFIED HYPERLIPIDEMIA TYPE: ICD-10-CM

## 2024-09-12 DIAGNOSIS — I10 ESSENTIAL HYPERTENSION: ICD-10-CM

## 2024-09-12 DIAGNOSIS — I25.10 CORONARY ARTERY DISEASE INVOLVING NATIVE CORONARY ARTERY OF NATIVE HEART WITHOUT ANGINA PECTORIS: ICD-10-CM

## 2024-09-12 DIAGNOSIS — I48.0 PAROXYSMAL ATRIAL FIBRILLATION (MULTI): ICD-10-CM

## 2024-09-12 DIAGNOSIS — Z87.891 FORMER CIGARETTE SMOKER: ICD-10-CM

## 2024-09-12 DIAGNOSIS — Z95.5 S/P CORONARY ARTERY STENT PLACEMENT: ICD-10-CM

## 2024-09-12 PROCEDURE — 1160F RVW MEDS BY RX/DR IN RCRD: CPT | Performed by: INTERNAL MEDICINE

## 2024-09-12 PROCEDURE — 3078F DIAST BP <80 MM HG: CPT | Performed by: INTERNAL MEDICINE

## 2024-09-12 PROCEDURE — 3077F SYST BP >= 140 MM HG: CPT | Performed by: INTERNAL MEDICINE

## 2024-09-12 PROCEDURE — 1036F TOBACCO NON-USER: CPT | Performed by: INTERNAL MEDICINE

## 2024-09-12 PROCEDURE — 99214 OFFICE O/P EST MOD 30 MIN: CPT | Performed by: INTERNAL MEDICINE

## 2024-09-12 PROCEDURE — 1159F MED LIST DOCD IN RCRD: CPT | Performed by: INTERNAL MEDICINE

## 2024-09-12 NOTE — LETTER
September 12, 2024     Raisa Nunez DO  [address] [place] [address]    Patient: Luis Torres   YOB: 1935   Date of Visit: 9/12/2024       Dear Dr. Raisa Nunez DO:    Thank you for referring Luis Torres to me for evaluation. Below are my notes for this consultation.  If you have questions, please do not hesitate to call me. I look forward to following your patient along with you.       Sincerely,     Brian Moody DO      CC: No Recipients  ______________________________________________________________________________________    Subjective   Luis Torres is a 89 y.o. male       Chief Complaint    Follow-up          89-year-old gentleman, doing well denies any cardiovascular events, complaints, nitrate usage, hospitalizations, heart failure, thromboembolic events.  He had 1 episode of hematuria since being off of anticoagulation however did not seek out urology consultation.  He has known history of prostate cancer and cystitis with previous urologic bleeding and therefore anticoagulation has been discontinued.    He has a history of paroxysmal A-fib, and history of redo CABG with PFO, mitral valve repair and tricuspid valve repair, left atrial appendage ligation in 2019. Patient had previous history of remote three-vessel CABG and PCI.     He has multiple medication intolerances that are reviewed including beta-blockers, ACE inhibitors, statins and anticoagulation.  Fortunately he has both mitral and tricuspid repairs and maze procedure and remains in a regular rhythm.  We have counseled him on his blood pressure today and the need for follow-up and possibly institution of antihypertensive therapy; he is somewhat reticent about this overall.    Will follow-up with blood pressure check in 8 weeks, follow-up again in 1 year clinically, institute appropriate antihypertensive therapy if persistently hypertensive.           Review of Systems   All other systems reviewed and are  "negative.           Vitals:    09/12/24 1044 09/12/24 1142   BP: 158/70 158/70   BP Location: Left arm Left arm   Patient Position: Sitting Sitting   Pulse: 66    Weight: 72.6 kg (160 lb)    Height: 1.753 m (5' 9\")         Objective   Physical Exam    Allergies  Statins-hmg-coa reductase inhibitors, Ace inhibitors, and Metoprolol     Current Medications  No current outpatient medications on file.                     Assessment/Plan   1. Coronary artery disease involving native coronary artery of native heart without angina pectoris  Follow Up In Cardiology      2. Essential hypertension        3. Hyperlipidemia, unspecified hyperlipidemia type        4. S/P coronary artery stent placement        5. Paroxysmal atrial fibrillation (Multi)        6. Former cigarette smoker                 Scribe Attestation  By signing my name below, IJessica LPN, Scribe   attest that this documentation has been prepared under the direction and in the presence of Brian Moody DO.     Provider Attestation - Scribe documentation    All medical record entries made by the Scribe were at my direction and personally dictated by me. I have reviewed the chart and agree that the record accurately reflects my personal performance of the history, physical exam, discussion and plan.   "

## 2024-09-12 NOTE — PROGRESS NOTES
"Subjective   Luis Torres is a 89 y.o. male       Chief Complaint    Follow-up          89-year-old gentleman, doing well denies any cardiovascular events, complaints, nitrate usage, hospitalizations, heart failure, thromboembolic events.  He had 1 episode of hematuria since being off of anticoagulation however did not seek out urology consultation.  He has known history of prostate cancer and cystitis with previous urologic bleeding and therefore anticoagulation has been discontinued.    He has a history of paroxysmal A-fib, and history of redo CABG with PFO, mitral valve repair and tricuspid valve repair, left atrial appendage ligation in 2019. Patient had previous history of remote three-vessel CABG and PCI.     He has multiple medication intolerances that are reviewed including beta-blockers, ACE inhibitors, statins and anticoagulation.  Fortunately he has both mitral and tricuspid repairs and maze procedure and remains in a regular rhythm.  We have counseled him on his blood pressure today and the need for follow-up and possibly institution of antihypertensive therapy; he is somewhat reticent about this overall.    Will follow-up with blood pressure check in 8 weeks, follow-up again in 1 year clinically, institute appropriate antihypertensive therapy if persistently hypertensive.           Review of Systems   All other systems reviewed and are negative.           Vitals:    09/12/24 1044 09/12/24 1142   BP: 158/70 158/70   BP Location: Left arm Left arm   Patient Position: Sitting Sitting   Pulse: 66    Weight: 72.6 kg (160 lb)    Height: 1.753 m (5' 9\")         Objective   Physical Exam    Allergies  Statins-hmg-coa reductase inhibitors, Ace inhibitors, and Metoprolol     Current Medications  No current outpatient medications on file.                     Assessment/Plan   1. Coronary artery disease involving native coronary artery of native heart without angina pectoris  Follow Up In Cardiology      2. " Essential hypertension        3. Hyperlipidemia, unspecified hyperlipidemia type        4. S/P coronary artery stent placement        5. Paroxysmal atrial fibrillation (Multi)        6. Former cigarette smoker                 Scribe Attestation  By signing my name below, IJessica LPN, Scribe   attest that this documentation has been prepared under the direction and in the presence of Brian Moody DO.     Provider Attestation - Scribe documentation    All medical record entries made by the Scribe were at my direction and personally dictated by me. I have reviewed the chart and agree that the record accurately reflects my personal performance of the history, physical exam, discussion and plan.

## 2024-11-06 ENCOUNTER — APPOINTMENT (OUTPATIENT)
Dept: CARDIOLOGY | Facility: CLINIC | Age: 89
End: 2024-11-06
Payer: MEDICARE

## 2024-11-06 VITALS
HEART RATE: 62 BPM | HEIGHT: 69 IN | SYSTOLIC BLOOD PRESSURE: 120 MMHG | DIASTOLIC BLOOD PRESSURE: 70 MMHG | BODY MASS INDEX: 24.2 KG/M2 | WEIGHT: 163.4 LBS

## 2024-11-06 DIAGNOSIS — Z95.5 S/P CORONARY ARTERY STENT PLACEMENT: ICD-10-CM

## 2024-11-06 DIAGNOSIS — I10 ESSENTIAL HYPERTENSION: ICD-10-CM

## 2024-11-06 DIAGNOSIS — E78.5 HYPERLIPIDEMIA, UNSPECIFIED HYPERLIPIDEMIA TYPE: ICD-10-CM

## 2024-11-06 DIAGNOSIS — Z87.891 FORMER CIGARETTE SMOKER: ICD-10-CM

## 2024-11-06 PROCEDURE — 99213 OFFICE O/P EST LOW 20 MIN: CPT | Performed by: INTERNAL MEDICINE

## 2024-11-06 PROCEDURE — 3078F DIAST BP <80 MM HG: CPT | Performed by: INTERNAL MEDICINE

## 2024-11-06 PROCEDURE — 1036F TOBACCO NON-USER: CPT | Performed by: INTERNAL MEDICINE

## 2024-11-06 PROCEDURE — 3074F SYST BP LT 130 MM HG: CPT | Performed by: INTERNAL MEDICINE

## 2024-11-06 PROCEDURE — 1159F MED LIST DOCD IN RCRD: CPT | Performed by: INTERNAL MEDICINE

## 2024-11-06 PROCEDURE — 1160F RVW MEDS BY RX/DR IN RCRD: CPT | Performed by: INTERNAL MEDICINE

## 2024-11-06 RX ORDER — ASPIRIN 81 MG/1
81 TABLET ORAL
Start: 2024-11-06 | End: 2025-11-06

## 2024-11-06 NOTE — PATIENT INSTRUCTIONS
Please bring all medicines, vitamins, and herbal supplements with you when you come to the office.    Prescriptions will not be filled unless you are compliant with your follow up appointments or have a follow up appointment scheduled as per instruction of your physician. Refills should be requested at the time of your visit.     Start baby aspirin 81 mg 5 days a week

## 2024-11-06 NOTE — PROGRESS NOTES
"Subjective   Luis Torres is a 89 y.o. male       Chief Complaint    Blood Pressure Check          89-year-old gentleman well-known to myself returns primarily for blood pressure check today which is normal on no medications.  However he has significant cardiovascular history detailed below.  Notably he is not on aspirin, statin or routine GDMT    April 2019  TVR #28 annuloplasty ring  MVR #29 Medtronic Simulus band  Ligation left atrial appendage  PFO closure     Redo CABG with reverse SVG to posterior lateral circumflex     Valvular function reassess September 2023 echo with normally functioning repairs.  LVEF 55 to 60%     Coronary artery disease  Current daily activity 4 METS without recurrent symptoms     Secondary prevention:  Statin intolerant  No diabetes  Untreated hyperlipidemia     Paroxysmal atrial fibrillation (CMS/HCC)  September 2023 EKG normal sinus rhythm  Prior 2021 EKG atrial fibrillation with CVR on no AV delilah agents  Denies any palpitations  Regular ausculatory rate and rhythm     Anticoagulant not tolerated  Recently evaluated by Dr. Pineda where a CT showed left atrial appendage ligation without evidence of sac.    As mentioned above, blood pressure is well-controlled on no therapy he is not on aspirin or statin.    Recommendation reinitiate aspirin 81 mg Monday through Friday, will follow-up in 1 year           ROS         Vitals:    11/06/24 1036 11/06/24 1040   BP: 120/72 120/70   BP Location: Left arm Right arm   Patient Position: Sitting Sitting   Pulse: 62    Weight: 74.1 kg (163 lb 6.4 oz)    Height: 1.753 m (5' 9\")         Objective   Physical Exam    Allergies  Statins-hmg-coa reductase inhibitors, Ace inhibitors, and Metoprolol     Current Medications  No current outpatient medications on file.                     Assessment/Plan   1. Essential hypertension  Follow Up In Cardiology      2. Former cigarette smoker        3. BMI 24.0-24.9, adult                 Scribe Attestation  By " signing my name below, I, Rhoda DOMINGUEZ RN   , Scribe   attest that this documentation has been prepared under the direction and in the presence of Brian Moody DO.     Provider Attestation - Scribe documentation    All medical record entries made by the Scribe were at my direction and personally dictated by me. I have reviewed the chart and agree that the record accurately reflects my personal performance of the history, physical exam, discussion and plan.

## 2025-09-17 ENCOUNTER — APPOINTMENT (OUTPATIENT)
Dept: CARDIOLOGY | Facility: CLINIC | Age: 89
End: 2025-09-17
Payer: MEDICARE